# Patient Record
Sex: FEMALE | Race: WHITE
[De-identification: names, ages, dates, MRNs, and addresses within clinical notes are randomized per-mention and may not be internally consistent; named-entity substitution may affect disease eponyms.]

---

## 2018-09-13 ENCOUNTER — HOSPITAL ENCOUNTER (INPATIENT)
Dept: HOSPITAL 36 - GERO2 | Age: 68
LOS: 26 days | Discharge: NURSING, REHAB OR LONG-TERM CARE | DRG: 885 | End: 2018-10-09
Attending: PSYCHIATRY & NEUROLOGY | Admitting: PSYCHIATRY & NEUROLOGY
Payer: MEDICARE

## 2018-09-13 VITALS — DIASTOLIC BLOOD PRESSURE: 68 MMHG | SYSTOLIC BLOOD PRESSURE: 117 MMHG

## 2018-09-13 DIAGNOSIS — E11.9: ICD-10-CM

## 2018-09-13 DIAGNOSIS — E78.5: ICD-10-CM

## 2018-09-13 DIAGNOSIS — Z88.5: ICD-10-CM

## 2018-09-13 DIAGNOSIS — Z88.8: ICD-10-CM

## 2018-09-13 DIAGNOSIS — I10: ICD-10-CM

## 2018-09-13 DIAGNOSIS — F29: Primary | ICD-10-CM

## 2018-09-13 DIAGNOSIS — Z88.1: ICD-10-CM

## 2018-09-13 DIAGNOSIS — Z91.19: ICD-10-CM

## 2018-09-13 PROCEDURE — Z7610: HCPCS

## 2018-09-14 LAB
CHOLEST SERPL-MCNC: 162 MG/DL (ref ?–200)
HDLC SERPL-MCNC: 52 MG/DL (ref 23–92)
TRIGL SERPL-MCNC: 119 MG/DL (ref ?–150)

## 2018-09-14 NOTE — PSYCHIATRIC EVALUATION
DATE OF SERVICE:  09/14/2018



JUSTIFICATION FOR HOSPITALIZATION:  The patient is coming in to the hospital,

believing her  was poisoning her.  The patient was walking in the middle

of the street and coherent.



CHIEF COMPLAINT:  "Please send me home now."



HISTORY OF PRESENT ILLNESS:  A 68-year-old female, confused, disoriented,

believes the year is 1980, knows the month of September, believes it is

Thursday, has no idea why she is here.  Apparently, she was believing her

 was trying to poison her.  She was leaving her residence therefore

walking in the middle of the street, walking in circles and coherent, noted to

be confused, irritable, agitated and following me around the unit preoccupied,

stating that she wants her calm and she wants to go home, very demanding, trying

to leave the unit.  The patient making statements that her  was trying to

kill her for some unclear reason.  Difficult to interview because she is so

fixated on leaving telling me to sign her out immediately.



PAST PSYCHIATRIC HISTORY:  Unclear, but there is some documentation about

possibly bipolar versus depression.



FAMILY HISTORY:  Noncontributory.



SOCIAL HISTORY:  The patient states she was born in [place], .  She

states she has 2 children, 15 grandchildren and unclear drugs or alcohol or 

tobacco.



MENTAL STATUS EXAMINATION:  Stated age.  Fair eye contact.  Speech rambling

loud.  Mood "ready to go home."  Affect flat.  Thought processes were

disoriented.  No SI, no HI.  No auditory or visual hallucinations, but the

patient does appear to be psychotic and paranoid.  Insight poor.  Judgment

diminished.



PROVISIONAL DIAGNOSES:  Psychosis, unspecified.  Under medical, please see full

H and P, also rule out dementia.



ESTIMATED LENGTH OF STAY:  7-10 days.



ASSESSMENT:  The patient's psychotic left her house, rambling, walking in

circles, paranoid, believing  is trying to kill her.



PLAN:  We will initiate low-dose antipsychotic, increase collateral.



TREATMENT PLAN:  Includes group as well as milieu therapy.



CONDITIONS FOR DISCHARGE:  Improved mood, improved affect, better control of any

psychotic symptoms.





DD: 09/14/2018 07:19

DT: 09/14/2018 08:30

JOB# [id number]  [id number]

## 2018-09-15 LAB
ANION GAP SERPL CALC-SCNC: 13.9 MMOL/L (ref 7–16)
BASOPHILS # BLD AUTO: 0.1 TH/CUMM (ref 0–0.2)
BASOPHILS NFR BLD AUTO: 1.3 % (ref 0–2)
BUN SERPL-MCNC: 14 MG/DL (ref 7–25)
CALCIUM SERPL-MCNC: 9.6 MG/DL (ref 8.6–10.3)
CHLORIDE SERPL-SCNC: 102 MEQ/L (ref 98–107)
CO2 SERPL-SCNC: 21.8 MEQ/L (ref 21–31)
CREAT SERPL-MCNC: 0.6 MG/DL (ref 0.6–1.2)
EOSINOPHIL # BLD AUTO: 0.1 TH/CMM (ref 0.1–0.4)
EOSINOPHIL NFR BLD AUTO: 1.2 % (ref 0–5)
ERYTHROCYTE [DISTWIDTH] IN BLOOD BY AUTOMATED COUNT: 12.7 % (ref 11.5–20)
GLUCOSE SERPL-MCNC: 170 MG/DL (ref 70–105)
HCT VFR BLD CALC: 42.8 % (ref 41–60)
HGB BLD-MCNC: 14 GM/DL (ref 12–16)
LYMPHOCYTE AB SER FC-ACNC: 1.9 TH/CMM (ref 1.5–3)
LYMPHOCYTES NFR BLD AUTO: 21.6 % (ref 20–50)
MCH RBC QN AUTO: 29.1 PG (ref 27–31)
MCHC RBC AUTO-ENTMCNC: 32.6 PG (ref 28–36)
MCV RBC AUTO: 89.2 FL (ref 81–100)
MONOCYTES # BLD AUTO: 0.5 TH/CMM (ref 0.3–1)
MONOCYTES NFR BLD AUTO: 5.6 % (ref 2–10)
NEUTROPHILS # BLD: 6.1 TH/CMM (ref 1.8–8)
NEUTROPHILS NFR BLD AUTO: 70.3 % (ref 40–80)
PLATELET # BLD: 424 TH/CMM (ref 150–400)
PMV BLD AUTO: 6.8 FL
POTASSIUM SERPL-SCNC: 3.7 MEQ/L (ref 3.5–5.1)
RBC # BLD AUTO: 4.8 MIL/CMM (ref 3.8–5.2)
SODIUM SERPL-SCNC: 134 MEQ/L (ref 136–145)
WBC # BLD AUTO: 8.7 TH/CMM (ref 4.8–10.8)

## 2018-09-15 RX ADMIN — ASPIRIN 81 MG SCH MG: 81 TABLET ORAL at 08:54

## 2018-09-15 RX ADMIN — FLUTICASONE PROPIONATE SCH SPR: 50 SPRAY, METERED NASAL at 08:53

## 2018-09-15 NOTE — HISTORY & PHYSICAL
ADMIT DATE:     09/13/2018



REASON FOR ADMISSION:  Psychiatric disorder.



HISTORY OF PRESENT ILLNESS:  This is a 68-year-old female with underlying

history of hypertension, hyperlipidemia, diabetes, some mental disorders, was

admitted to Geropsych Unit for underlying psychiatric illness by Dr. Craft. 

Dr. Craft requested medical H and P on this patient.



PAST MEDICAL HISTORY:  Diabetes, hypertension, hyperlipidemia and mental

disorders.



PAST SURGICAL HISTORY:  Noncontributory.



SOCIAL HISTORY:  Lives in a nursing facility.  No alcohol, tobacco or street

drug use.



CURRENT MEDICATIONS:  As per medication reconciliation.



ALLERGIES:  AMLODIPINE, BENAZEPRIL,and CODEINE.



REVIEW OF SYSTEMS:  No fever, no chills, no diarrhea, no vomiting, no abdominal

pain, no chest pain, no short of breath, no headache.  No other medical

complaints.



PHYSICAL EXAMINATION:

VITAL SIGNS:  Temperature 97.2, pulse 63, respirations 19 and blood pressure

136/81.

HEENT:  Unremarkable.

HEART:  S1 and S2 normal.

LUNGS:  Clear to auscultation.

ABDOMEN:  Soft.

EXTREMITIES:  No edema.



ASSESSMENT:

1.  Hypertension, stable.

2.  Diabetes.

3.  Hyperlipidemia.

4.  Mental disorder.



PLAN:  The patient admitted to Geropsych unit.  patient's underlying medical 
problems 

seems stable.  Home medication reconciled, continue.  Lab orders given.  The

patient is medically stable.



Thank you Dr. Craft for allowing me to participate in the care of this

patient.





DD: 09/14/2018 21:52

DT: 09/15/2018 00:35

[facility]# [id number]  [id number]

[facility]

## 2018-09-16 RX ADMIN — ASPIRIN 81 MG SCH: 81 TABLET ORAL at 09:45

## 2018-09-16 RX ADMIN — ASPIRIN 81 MG SCH MG: 81 TABLET ORAL at 10:00

## 2018-09-16 RX ADMIN — FLUTICASONE PROPIONATE SCH SPR: 50 SPRAY, METERED NASAL at 09:45

## 2018-09-16 NOTE — PROGRESS NOTES
DATE:  09/15/2018



This is Dr. Bailey covering for Dr. Craft.



This is a 68-year-old female who is presenting here, confused and disoriented,

believing that it was 1980 and also month of September, but believed that it was

Thursday and not understanding why she was here.



Today on face-to-face evaluation, the patient was responding internally,

aggressive, yelling, screaming and very difficult to redirect in conversation,

had required emergent medications.



MENTAL STATUS EXAMINATION:  The patient is on lorazepam as needed, risperidone

0.5 mg p.o. b.i.d.  



ASSESSMENT AND PLAN:  The patient, who presents irritable, agitated and

aggressive requiring emergent medications.  We will continue with primary

psychiatrist's treatment plan. We will obtain more collateral and baseline

information.





DD: 09/15/2018 11:25

DT: 09/16/2018 18:28

[facility]# [id number]  [id number]

## 2018-09-17 RX ADMIN — FLUTICASONE PROPIONATE SCH SPR: 50 SPRAY, METERED NASAL at 09:29

## 2018-09-17 RX ADMIN — ASPIRIN 81 MG SCH MG: 81 TABLET ORAL at 09:27

## 2018-09-17 NOTE — PROGRESS NOTES
DATE:  



SUBJECTIVE:  The patient was seen and evaluated.  The patient's chart reviewed.



Overnight, the patient required emergent medication because of aggressive

behavior.  Today on face-to-face evaluation, the patient admits that she has

been multiple hospitalizations and because of aggressive behavior, when

attempting to discuss it in more detail what she means by aggressive behavior,

she becomes more agitated, starts banging on the Kerline chair.



MENTAL STATUS EXAMINATION:  Psychotic, delusional, aggressive as evident by

banging her hand on the Kerline chair.



ASSESSMENT AND PLAN:  The patient is a 68-year-old female, severely aggressive

and agitated.  We will continue with the current medication regimen.  She will

continue to be safe to target the patient's irritable and agitative state.





DD: 09/16/2018 11:11

DT: 09/17/2018 07:45

[facility]# [id number]  [id number]

## 2018-09-17 NOTE — PROGRESS NOTES
DATE:  09/17/2018



SUBJECTIVE:  The patient coming to the hospital, believing her  was

poisoning her.  The patient was walking in the middle of the street, not

coherent.  On face-to-face, the patient is very intrusive, following me around,

hitting the table, restless, requiring constant redirection, prompting, very

demanding, constantly asking to go home.  When I tell her she cannot go home,

she continues asking, very ruminative.  The patient requiring emergency orders

of medications, irritable, calling the nursing staff all the time, appearing

confused, does not know why she is here.  At one point, believing that staff is

trying to kill her as well.



ASSESSMENT:  The patient is intrusive, agitated, making nonsensical statements,

believing  was poisoning her.



PLAN:  The patient will be receiving Haldol cocktail x1 now.  Apparently

allergic to BENADRYL.  Given her ongoing symptoms, she is not safe for

discharge.





DD: 09/17/2018 10:48

DT: 09/17/2018 21:03

JOB# [id number]  [id number]

## 2018-09-18 RX ADMIN — FLUTICASONE PROPIONATE SCH SPR: 50 SPRAY, METERED NASAL at 10:14

## 2018-09-18 RX ADMIN — ASPIRIN 81 MG SCH MG: 81 TABLET ORAL at 10:16

## 2018-09-19 RX ADMIN — ASPIRIN 81 MG SCH MG: 81 TABLET ORAL at 09:46

## 2018-09-19 RX ADMIN — FLUTICASONE PROPIONATE SCH SPR: 50 SPRAY, METERED NASAL at 09:45

## 2018-09-19 NOTE — PROGRESS NOTES
DATE:  09/18/2018



SUBJECTIVE:  The patient disoriented, preoccupied, intrusive, apparently

believed her  was trying to poison her.  Mostly fixated on leaving.  She

becomes restless, combative, aggressive towards staff, at times was trying to

hit others or approach her.  The patient has required emergency medications as

well.  She has been here very confused, has no recollection as to why she is

here, taking Risperdal.  Recent dose increase.



ASSESSMENT:  The patient remains symptomatic, agitated, trying to hit other

people, very confused, delusional, thought her  was trying to poison her.



PLAN:  We will continue to monitor, titrate and adjust medications.  Given the

patient's ongoing aggressive symptoms, she is not safe for discharge.





DD: 09/18/2018 13:27

DT: 09/19/2018 02:49

JOB# [id number]  [id number]

## 2018-09-19 NOTE — PROGRESS NOTES
DATE:  09/19/2018



SUBJECTIVE:  The patient is very intrusive, following me around, very

repetitious, asking the same questions over and over and over again ad nauseam,

very forgetful, needing a lot of prompting, constant redirection, wanting to

leave the hospital, stating that she wants to go to [place] to be near her

.  Noncompliant at times, restless, pacing.  She is taking medications. 

No overt side effects noted.  EPS has been contacted because it seems that the

patient's  is not able to care for the patient given how confused she is,

how aggressive she can become, sometimes yelling, irritable.  Medications were

noted.



ASSESSMENT:  The patient is confused, highly intrusive, a lot of behavioral

disturbances, wandering back and forth in the unit asking the same questions

over ad nauseam.



PLAN:  We will continue to monitor.  We are also at this time trying to confirm

the safe disposition plan at the same time that we are attempting to stabilize

her.





DD: 09/19/2018 16:25

DT: 09/19/2018 23:13

[facility]# [id number]  [id number]

## 2018-09-20 RX ADMIN — FLUTICASONE PROPIONATE SCH SPR: 50 SPRAY, METERED NASAL at 09:23

## 2018-09-20 RX ADMIN — ASPIRIN 81 MG SCH MG: 81 TABLET ORAL at 09:05

## 2018-09-20 NOTE — PROGRESS NOTES
DATE:  09/20/2018



SUBJECTIVE:  The patient is very confused, disoriented, aggressive, agitated,

ripping off the nurse's clothes and banging on tables and aggressive, needs near

constant redirection, very restless, unable to be cared for.  Certainly at a

lower level of care, is screaming, yelling.



ASSESSMENT:  The patient requiring a Kerline chair, very aggressive, agitated,

combative at times.



PLAN:  We will continue to monitor.  The patient will likely need titration of

medications.  We will increase Ativan dosing p.r.n. and will stop Risperdal and

start low dose of Seroquel b.i.d.



MEDICATIONS:  Reviewed.





DD: 09/20/2018 10:08

DT: 09/20/2018 14:51

JOB# [id number]  [id number]

## 2018-09-21 RX ADMIN — ASPIRIN 81 MG SCH MG: 81 TABLET ORAL at 09:25

## 2018-09-21 RX ADMIN — FLUTICASONE PROPIONATE SCH SPR: 50 SPRAY, METERED NASAL at 09:22

## 2018-09-21 NOTE — PROGRESS NOTES
DATE:  09/21/2018



The patient ruminative, repetition, still asking to live, to be transferred,

asks is everything okay multiple times during the time that I talked to her,

poorly oriented, very confused, requiring high level of redirection, prompting,

states she wants to go home with her .  It is unclear if she is able to

really care for her basic needs at home.  Adult Protective Services is involved

because of the  who is elderly himself cannot really take care of the

patient.  The patient seems somewhat calmer versus yesterday.  Cervical dosing

seems to be calming the patient down.  We will continue to monitor.  The patient

remains impulsive, unpredictable.  We will also consider Namenda.





DD: 09/21/2018 11:35

DT: 09/21/2018 21:09

JOB# [id number]  [id number]

## 2018-09-22 RX ADMIN — ASPIRIN 81 MG SCH MG: 81 TABLET ORAL at 08:53

## 2018-09-22 RX ADMIN — FLUTICASONE PROPIONATE SCH SPR: 50 SPRAY, METERED NASAL at 08:53

## 2018-09-22 NOTE — PROGRESS NOTES
DATE:  09/22/2018



SUBJECTIVE:  The patient slept for about 5 hours, agitated, very fixated on

being transferred.  There is no indication that a transfer is possible, but the

patient believes otherwise she repeats this probably 100 times a day if not

more.  I explained to her what is going on and then a few minutes later, she

repeats the same thing that she wants to be transferred.  The patient agitated,

throwing cups of water, punching a table, threatening to throw a binder at me to

the point that the staff have to get involved and redirect her, wandering the

units.  The patient disoriented, needs constant reorientation.  She knows where

she is.  She knows she is in the hospital.  She has no idea why she is in the

hospital.  She does not know the year, the month.  The patient focused on going

home, but as of right now, she cannot go home and she does not remember why she

is here, asking to speak to a , but then forgetting that she never

actually spoke to a  and talking about the court and the  for some

reason, trying to scratch staff.



ASSESSMENT:  The patient is combative, demanding, agitated, threatening to harm

staff.  Currently on Namenda.  She may benefit from dosing of Aricept.



PLAN:  We will continue to monitor.  Given her ongoing symptoms, she is not safe

for discharge at this time.





DD: 09/22/2018 06:40

DT: 09/22/2018 11:19

JOB# [id number]  [id number]

## 2018-09-23 RX ADMIN — FLUTICASONE PROPIONATE SCH: 50 SPRAY, METERED NASAL at 10:18

## 2018-09-23 RX ADMIN — ASPIRIN 81 MG SCH MG: 81 TABLET ORAL at 09:28

## 2018-09-23 NOTE — PROGRESS NOTES
DATE:  09/23/2018



SUBJECTIVE:  The patient is disorganized, agitated convinced the staff is

putting poison in her water, noted to be paranoid, unpredictable, requiring

constant redirection, prompting, at times going in a Kerline chair.  The patient

mumbling, claiming that people are trying to kidnap her, calling staff members,

pros and cons.  The patient did have some EPS from Seroquel.  It seems it was

stopped.



ASSESSMENT:  The patient is paranoid, disoriented, confused, psychotic

appearing.  We will stop Seroquel.  The patient with very poor sleep, agitation

and psychosis.  We will initiate Zyprexa dosing.





DD: 09/23/2018 06:30

DT: 09/23/2018 08:55

JOB# [id number]  [id number]

## 2018-09-24 RX ADMIN — ASPIRIN 81 MG SCH MG: 81 TABLET ORAL at 09:31

## 2018-09-24 RX ADMIN — FLUTICASONE PROPIONATE SCH SPR: 50 SPRAY, METERED NASAL at 09:31

## 2018-09-25 RX ADMIN — ASPIRIN 81 MG SCH MG: 81 TABLET ORAL at 09:24

## 2018-09-25 RX ADMIN — FLUTICASONE PROPIONATE SCH: 50 SPRAY, METERED NASAL at 09:23

## 2018-09-25 NOTE — PROGRESS NOTES
DATE:  09/24/2018



SUBJECTIVE:  The patient in bed, restless, hyperverbal, requiring emergency

dosing of Ativan.  Noted to be symptomatic, very psychotic, believing that the

Germans are trying to poison her, people are trying to kill her, the staff is

trying to kill her and poison her, nonsensical, still asking to be transferred. 

I told her transfer is not possible.  She is very ruminative, disorganized,

easily agitated, hostile.



ASSESSMENT:  Ongoing safety concerns.  The patient with ongoing psychotic

symptoms.  I will be increasing her dosing of Zyprexa today.





DD: 09/24/2018 16:37

DT: 09/25/2018 01:32

JOB# [id number]  [id number]

## 2018-09-25 NOTE — PROGRESS NOTES
DATE:  09/25/2018



SUBJECTIVE:  The patient remains intrusive, agitated, highly repetitive and

ruminative, demanding to leave, but has nowhere to go.  Does not seem to

understand why she is here, still believes that the Germans are trying to hurt

or harm her and poison her, believing that staff are trying to harm her.  The

patient is highly restless.   at bedside, notes that he cannot take care

of the patient and that she throws things at home and gets violent and

aggressive.  The patient is very forgetful, constantly asking the same questions

over and over again.



ASSESSMENT:  The patient is confused, combative, highly restless.



PLAN:  We will continue to monitor.  The patient remains disoriented, paranoid,

cannot be cared for at a lesser level of care.  I will titrate her Zyprexa

dosing.  No side effects noted on exam.  Discussed with the patient.  Discussed

with .  Discussed with staff.





DD: 09/25/2018 13:40

DT: 09/25/2018 21:30

JOB# [id number]  [id number]

## 2018-09-26 RX ADMIN — FLUTICASONE PROPIONATE SCH SPR: 50 SPRAY, METERED NASAL at 08:34

## 2018-09-26 RX ADMIN — ASPIRIN 81 MG SCH MG: 81 TABLET ORAL at 08:33

## 2018-09-26 NOTE — PROGRESS NOTES
DATE:  09/26/2018



SUBJECTIVE:  The patient aggressive, confused, labile, intrusive, getting very

close to me to the point that I have to ask her to back away and staff has to

get involved, yelling at times, grabbing items, sometimes trying to strike out

at staff.  The patient is paranoid, believing that there is poison in the water,

that staff is trying to kill her.  "The Germans are trying to kill her."  The

patient is very unruly, following me around the unit, believing that the water

is contaminated.  The patient sometimes refusing medications.  The patient wants

to leave, wants to be transferred, but does not really know where transfer to

right now given her ongoing behavior.



ASSESSMENT:  The patient is confused, forgetful, paranoid, at times medications

noncompliant, other times she does take medications.



PLAN:  We will continue to monitor, ongoing concerns about compliance.  I may

need to follow a [employer] petition.





DD: 09/26/2018 16:29

DT: 09/26/2018 21:09

JOB# [id number]  [id number]

## 2018-09-27 RX ADMIN — ASPIRIN 81 MG SCH MG: 81 TABLET ORAL at 09:38

## 2018-09-27 RX ADMIN — FLUTICASONE PROPIONATE SCH SPR: 50 SPRAY, METERED NASAL at 09:38

## 2018-09-27 NOTE — PROGRESS NOTES
DATE:  09/27/2018



Case was discussed with staff of the patient, reviewed records.  This is

covering for Dr. Craft.  A 68-year-old female who was admitted to ____, she

was disoriented, confused, agitated.  The patient has no idea why she was

admitted, she was living residence, walking in the middle of the street in

circles, confused, irritable, agitated with a possible history of bipolar

disorder.  The patient continues to be unpredictable and impulsive.  Actually,

she ____, she appeared to be very aggressive, irritable, demanding.  She has

been compliant with the medication with no side effects, no sedation, no nausea.

 She is on Aricept 5 mg at bedtime, Namenda 5 mg daily and Zyprexa 10 mg at

bedtime to 12.5 mg daily with no side effects, no sedation, no nausea, no

extrapyramidal symptoms.  I will be increasing Namenda to 5 mg twice a day and I

will continue the patient in group therapy, milieu therapy, and adjust

medications as needed.





DD: 09/27/2018 11:58

DT: 09/27/2018 13:25

JOB# [id number]  [id number]

## 2018-09-28 RX ADMIN — FLUTICASONE PROPIONATE SCH: 50 SPRAY, METERED NASAL at 09:51

## 2018-09-28 RX ADMIN — ASPIRIN 81 MG SCH MG: 81 TABLET ORAL at 09:52

## 2018-09-28 NOTE — PROGRESS NOTES
DATE:  09/28/2018



SUBJECTIVE:  Case was discussed with staff of the patient.  The patient

continues to be unpredictable, impulsive, easily agitated, continues to have

poor insight.  Continues to be confused, unable to make safe plan for self-care.

 She is compliant with the medication with no side effects, no sedation, no

nausea, no extrapyramidal symptoms and we will continue to work with the patient

in group therapy, milieu therapy, adjust medication as needed.





DD: 09/28/2018 12:20

DT: 09/28/2018 21:38

JOB# [id number]  [id number]

## 2018-09-29 RX ADMIN — ASPIRIN 81 MG SCH: 81 TABLET ORAL at 08:49

## 2018-09-29 RX ADMIN — FLUTICASONE PROPIONATE SCH: 50 SPRAY, METERED NASAL at 08:56

## 2018-09-29 NOTE — PROGRESS NOTES
DATE:  



Dr. Aguero covering for Dr. Craft.



SUBJECTIVE:  Chart reviewed and the patient interviewed.  Also discussed the

patient's condition with the staff and reviewed records and labs.  The patient

is still agitated and is still restless.  The patient also is fighting with the

staff.  She also tries to hit others.  The patient also is selective with taking

her medications and sometimes to take medications, sometimes she does not.  She

also paranoid and thinks that the staff are trying to poison her.  Otherwise, no

side effects of medications.



ASSESSMENT:  The patient is still agitated and is still psychotic.



TREATMENT PLAN:  We will increase Zyprexa to 5 mg in the morning and 10 mg at

bedtime.  Also, continue adjusting psychotropic medications and continue to work

on behavioral modifications.





DD: 09/29/2018 08:08

DT: 09/29/2018 08:49

JOB# [id number]  [id number]

## 2018-09-29 NOTE — GENERAL PROGRESS NOTE
Subjective





- Review of Systems


Service Date: 18


Subjective: 





Patient doing ok  Per nursing staff patient has been refusing her meds 





Objective





- Results


Result Diagrams: 


 09/15/18 10:27





 09/15/18 10:27


Recent Labs: 


 Laboratory Last Values











WBC  8.7 Th/cmm (4.8-10.8)   09/15/18  10:27    


 


RBC  4.80 Mil/cmm (3.80-5.20)   09/15/18  10:27    


 


Hgb  14.0 gm/dL (12-16)   09/15/18  10:27    


 


Hct  42.8 % (41.0-60)   09/15/18  10:27    


 


MCV  89.2 fl ()   09/15/18  10:27    


 


MCH  29.1 pg (27.0-31.0)   09/15/18  10:    


 


MCHC Differential  32.6 pg (28.0-36.0)   09/15/18  10:    


 


RDW  12.7 % (11.5-20.0)   09/15/18  10:27    


 


Plt Count  424 Th/cmm (150-400)  H  09/15/18  10:27    


 


MPV  6.8 fl  09/15/18  10:27    


 


Neutrophils %  70.3 % (40.0-80.0)   09/15/18  10:27    


 


Lymphocytes %  21.6 % (20.0-50.0)   09/15/18  10:    


 


Monocytes %  5.6 % (2.0-10.0)   09/15/18  10:    


 


Eosinophils %  1.2 % (0.0-5.0)   09/15/18  10:    


 


Basophils %  1.3 % (0.0-2.0)   09/15/18  10:27    


 


Sodium  134 mEq/L (136-145)  L  09/15/18  10:27    


 


Potassium  3.7 mEq/L (3.5-5.1)   09/15/18  10:27    


 


Chloride  102 mEq/L ()   09/15/18  10:27    


 


Carbon Dioxide  21.8 mEq/L (21.0-31.0)   09/15/18  10:27    


 


Anion Gap  13.9  (7.0-16.0)   09/15/18  10:27    


 


BUN  14 mg/dL (7-25)   09/15/18  10:27    


 


Creatinine  0.6 mg/dL (0.6-1.2)   09/15/18  10:27    


 


Est GFR ( Amer)  > 60.0 ml/min (>90)   09/15/18  10:27    


 


Est GFR (Non-Af Amer)  > 60.0 ml/min  09/15/18  10:27    


 


BUN/Creatinine Ratio  23.3   09/15/18  10:27    


 


Glucose  170 mg/dL ()  H  09/15/18  10:27    


 


POC Glucose  122 MG/DL (70 - 105)  H  18  22:15    


 


Calcium  9.6 mg/dL (8.6-10.3)   09/15/18  10:27    


 


Triglycerides  119 mg/dL (<150)   18  07:00    


 


Cholesterol  162 mg/dL (<200)   18  07:00    


 


LDL Cholesterol Direct  95 mg/dL ()   18  07:00    


 


HDL Cholesterol  52 mg/dL (23-92)   18  07:00    














- Physical Exam


Vitals and I&O: 


 Vital Signs











Temp  97.8 F   18 20:11


 


Pulse  100   18 21:01


 


Resp  18   18 20:11


 


BP  101/65   18 21:01


 


Pulse Ox  97   18 20:11








 Intake & Output











 18





 06:59 18:59 06:59


 


Intake Total 240 1000 240


 


Balance 240 1000 240


 


Intake:   


 


  Oral 240 1000 240


 


Other:   


 


  # Voids 2 3 2


 


  # Bowel Movements  1 











Active Medications: 


Current Medications





Acetaminophen (Tylenol)  650 mg PO Q4HR PRN


   PRN Reason: Mild Pain / Temp above 100


   Stop: 18 20:48


   Last Admin: 18 22:54 Dose:  650 mg


Aspirin (Aspirin Chewable)  81 mg PO DAILY MELITA


   Stop: 18 08:59


   Last Admin: 18 08:49 Dose:  Not Given


Donepezil HCl (Aricept)  5 mg PO HS MELITA


   Stop: 18 20:59


   Last Admin: 18 21:01 Dose:  5 mg


Fluticasone Propionate (Flonase)  1 spr NS DAILY MELITA


   Stop: 18 08:59


   Last Admin: 18 08:56 Dose:  Not Given


Hydralazine HCl (Apresoline)  10 mg PO TID MELITA


   Stop: 18 21:59


   Last Admin: 18 21:01 Dose:  Not Given


Lorazepam (Ativan)  1 mg PO Q6HR PRN; Protocol


   PRN Reason: Anxiety


   Stop: 10/13/18 20:48


   Last Admin: 18 14:02 Dose:  1 mg


Magnesium Hydroxide (Milk Of Magnesia)  30 ml PO HS PRN


   PRN Reason: Constipation


Memantine (Namenda)  5 mg PO BID MELITA


   Stop: 18 16:59


   Last Admin: 18 18:25 Dose:  Not Given


Metformin HCl (Glucophage)  500 mg PO AC MELITA


   Stop: 18 07:29


   Last Admin: 18 18:25 Dose:  Not Given


Olanzapine (Zyprexa)  10 mg PO HS MELITA; Protocol


   Stop: 18 20:59


   Last Admin: 18 21:02 Dose:  10 mg


Olanzapine (Zyprexa)  5 mg PO DAILY MELITA; Protocol


   Stop: 18 08:59


   Last Admin: 18 08:49 Dose:  5 mg


Valsartan (Diovan)  160 mg PO BID MELITA


   Stop: 18 08:59


   Last Admin: 18 18:25 Dose:  Not Given








General: Other (confused)


Cardiovascular: Regular rate


Lungs: Clear to auscultation





Assessment/Plan





- Assessment


Assessment: 





DM II


HTN


NON COMPLIANCE


MENTAL HEALTH DISORDER





- Plan


Plan: 





Medication compliance advised


Continue current medical management


Psych eval and management per psychiatrist








Nutritional Asmnt/Malnutr-PDOC





- Dietary Evaluation


Malnutrition Findings (Please click <Entered> for more info): 








Nutritional Asmnt/Malnutrition                             Start:  18 15:

49


Text:                                                      Status: Complete    

  


Freq:                                                                          

  


Protocol:                                                                      

  


 Document     18 15:49  LCLILLYG  (Rec: 18 16:08  PORFIRIOG  [id number])


 Nutritional Asmnt/Malnutrition


     Patient General Information


      Nutritional Screening                      Moderate Risk


      Diagnosis                                  psychosis


      Pertinent Medical Hx/Surgical Hx           DM, HTN, hyperlipidemia,


                                                 mental disorders


      Subjective Information                     Per EMR, PO intake %,


                                                 avg 75%.


      Current Diet Order/ Nutrition Support      [facility] soft chopped, DM


      Pertinent Medications                      glucophage


      Pertinent Labs                             9/15 Na 134, glucose Ca 9.6


     Nutritional Hx/Data


      Height                                     1.65 m


      Height (Calculated Centimeters)            165.1


      Current Weight (lbs)                       80.286 kg


      Weight (Calculated Kilograms)              80.3


      Weight (Calculated Grams)                  85454.8


      Ideal Body Weight                          125


      Body Mass Index (BMI)                      29.4


     GI Symptoms


      Last BM                                    9/15 x 2


      Skin Integrity/Comment:                    intact


      Current %PO                                Good (%)


     Estimated Nutritional Goals


      BEE in Kcals:                              Using Current wt


      Calories/Kcals/Kg                          23-27


      Kcals Calculated                           4006-6548


      Protein:                                   Using Current wt


      Protein g/k.8


      Protein Calculated                         64


      Fluid: ml                                  1840-2160ml (1ml/kcal)


     Nutritional Problem


      No current Nutrition Prob


       Problem                                   N/A


     Malnutrition Alert


      Is there a minimum of two criteria         No


       selected?                                 


       Query Text:Check all the applicable       


       criteria. A minimum of two criteria are   


       recommended for diagnosis of either       


       severe or non-severe malnutrition.        


     Malnutrition Related to Morbid Obesity


      Malnutrition related to morbid obesity     No


     Intervention/Recommendation


      Comments                                   1. Continue with [facility] soft


                                                 chopped diabetic diet as


                                                 ordered.


                                                 2. Monitor PO intake, wt, labs


                                                 and skin integrity


                                                 3. F/U as low risk in 7 days,


                                                 


     Expected Outcomes/Goals


      Expected Outcomes/Goals                    1. PO intake to meet at least


                                                 75% of nutritional needs.


                                                 2. Wt stability, skin to


                                                 remain intact, labs to


                                                 approach WNL.

## 2018-09-30 RX ADMIN — ASPIRIN 81 MG SCH MG: 81 TABLET ORAL at 09:14

## 2018-09-30 RX ADMIN — FLUTICASONE PROPIONATE SCH: 50 SPRAY, METERED NASAL at 13:35

## 2018-10-01 RX ADMIN — FLUTICASONE PROPIONATE SCH: 50 SPRAY, METERED NASAL at 11:51

## 2018-10-01 RX ADMIN — ASPIRIN 81 MG SCH: 81 TABLET ORAL at 11:51

## 2018-10-01 NOTE — PROGRESS NOTES
DATE:  09/30/2018



Chart reviewed and the patient interviewed.  Also, discussed the patient's

condition with the staff and reviewed records and labs.  The patient is very

agitated and she is restless.  The patient also was fighting and tries to hit

others.  The patient also told me that the staff are trying to poison her and

that she is refusing to take medications, thinking that the medicine is a

poison.  At the same time when I was trying to interview the patient, the

patient was trying to push me to enter is into another unit and she was very

agitated and aggressive and it was difficult for me to calm her down.  The

patient also is still in irritable and angry mood and also her thoughts are

disorganized and was not able to give me any safe plan for self-care.



ASSESSMENT:  The patient is restless, still agitated and is still paranoid and

considered to be gravely disabled.



TREATMENT PLAN:  Continue monitoring her behavior and her condition closely. 

Also continue adjusting psychotropic medications.  Also, we will place the

patient on 30 days certificate based on grave disability.





DD: 09/30/2018 10:39

DT: 10/01/2018 01:22

JOB# [id number]  [id number]

## 2018-10-02 RX ADMIN — FLUTICASONE PROPIONATE SCH: 50 SPRAY, METERED NASAL at 15:33

## 2018-10-02 RX ADMIN — ASPIRIN 81 MG SCH: 81 TABLET ORAL at 15:33

## 2018-10-02 NOTE — PROGRESS NOTES
DATE:  10/02/2018



The patient is combative, trying to throw things, agitated, convinced nursing

staff trying to hurt her, paranoid, requiring emergency medications this

morning, refusing medications stating in fact she is not refusing medications,

worried about the side effects of medications, but she is not taking them to

have side effects.  The patient agitated, combative, violent, aggressive, trying

to grab staff.



PLAN:  We are awaiting the court for the [place] hearing.  The patient remains

violent, aggressive, paranoid.





DD: 10/02/2018 08:08

DT: 10/02/2018 19:47

JOB# [id number]  [id number]

## 2018-10-02 NOTE — PROGRESS NOTES
DATE:  10/01/2018



SUBJECTIVE:  The patient was seen, very aggressive, trying to grab me to the

point that I have to walk away and exit the units.  The patient still believes

that staff was trying to poison her, alluding to having ____, osteoporosis.  The

patient is stating that she wants to be transferred.  The patient remains

aggressive, combative, trying to hit other people, very unruly, refusing to take

her medications.  Reason is unclear, other than her telling me that she does not

feel that she needs.  The patient is sleeping well, eating well, very impulsive,

restless, unpredictable.



ASSESSMENT:  The patient is unruly, combative, violent, aggressive, even

aggressive with this clinician.



PLAN:  We will initiate Mitchel petition.  The patient is currently on a 30-day

hold.





DD: 10/01/2018 21:14

DT: 10/02/2018 17:25

JOB# [id number]  [id number]

## 2018-10-03 RX ADMIN — FLUTICASONE PROPIONATE SCH: 50 SPRAY, METERED NASAL at 10:54

## 2018-10-03 RX ADMIN — ASPIRIN 81 MG SCH: 81 TABLET ORAL at 10:54

## 2018-10-03 NOTE — PROGRESS NOTES
DATE:  10/03/2018



The patient remains confused, disoriented, Riese petition was upheld.  The

patient touching the heads of other patients, getting up from the seat and

sitting down, banging on the table, refusing ordered medications.  Riese was

upheld.  We will start Haldol IM backup.  The patient is unruly, aggressive,

violent, grabbing people, paranoid, believing that her food is being poisoned,

hyperverbal, loud, demanding at times.



ASSESSMENT:  The patient unruly, hyperverbal, aggressive.



PLAN:  We will continue to monitor.  I will be stopping her Zyprexa and will be

initiating Haldol.  She is taking Haldol IM and it seems to be effective.  We

will plan to give Haldol Decanoate over the next few days, based on tolerability

and efficacy given the patient's poor medication compliance.





DD: 10/03/2018 17:10

DT: 10/03/2018 19:10

[facility]# [id number]  [id number]

## 2018-10-04 RX ADMIN — ASPIRIN 81 MG SCH MG: 81 TABLET ORAL at 09:04

## 2018-10-04 RX ADMIN — FLUTICASONE PROPIONATE SCH: 50 SPRAY, METERED NASAL at 09:28

## 2018-10-05 RX ADMIN — ASPIRIN 81 MG SCH MG: 81 TABLET ORAL at 09:56

## 2018-10-05 RX ADMIN — HALOPERIDOL LACTATE PRN MG: 5 INJECTION, SOLUTION INTRAMUSCULAR at 10:44

## 2018-10-05 RX ADMIN — FLUTICASONE PROPIONATE SCH: 50 SPRAY, METERED NASAL at 09:57

## 2018-10-05 RX ADMIN — ASPIRIN 81 MG SCH: 81 TABLET ORAL at 10:42

## 2018-10-05 NOTE — PROGRESS NOTES
DATE:  



SUBJECTIVE:  Chart reviewed and the patient interviewed.  Also discussed the

patient's condition with the staff and reviewed records and labs.  The patient

continued to be combative and continued to be aggressive with the staff,

especially during helping her with her ADLs.  The patient also has difficulty

sitting still and yelling and screaming and wandering into other patient's

rooms.  The patient also has difficulty sleeping at night.  She also continued

to be delusional and paranoid and thinks that staff are trying to poison her. 

She also needs lots of redirections.  Otherwise, the patient is compliant with

taking her medications with no side effects of medications.



ASSESSMENT:  The patient is still agitated and in irritable mood.



TREATMENT PLAN:  Continue to monitor her behavior and her condition closely. 

Also, we will increase Haldol to 5 mg twice a day and will continue to follow

up.





DD: 10/05/2018 06:40

DT: 10/05/2018 07:10

[facility]# [id number]  [id number]

## 2018-10-05 NOTE — PROGRESS NOTES
DATE:  



Dr. Aguero covering for Dr. Craft.



SUBJECTIVE:  Chart reviewed and the patient interviewed.  Also discussed the

patient's condition with the staff and reviewed records and labs.  The patient

is still confused and still seems to be disoriented.  The patient also is still

in irritable mood and she is reluctant to take medications, but at the same

time, the patient is ____ and she has been taking her medications.  The patient

also is still in irritable and aggressive.  Otherwise, the patient continued to

comply with taking Haldol IM since other medications stopped and plenty of

Haldol Decanoate.



ASSESSMENT:  The patient is still aggressive, but not as psychotic as before.



TREATMENT PLAN:  Continue to monitor her behavior and her condition closely. 

Also, continue adjusting psychotropic medications and work on behavior

modification as well as her compliance with taking medications.





DD: 10/04/2018 22:33

DT: 10/05/2018 01:45

JOB# [id number]  [id number]

## 2018-10-06 RX ADMIN — ASPIRIN 81 MG SCH MG: 81 TABLET ORAL at 09:00

## 2018-10-06 RX ADMIN — FLUTICASONE PROPIONATE SCH: 50 SPRAY, METERED NASAL at 09:03

## 2018-10-07 RX ADMIN — ASPIRIN 81 MG SCH: 81 TABLET ORAL at 12:25

## 2018-10-07 RX ADMIN — HALOPERIDOL LACTATE PRN MG: 5 INJECTION, SOLUTION INTRAMUSCULAR at 09:31

## 2018-10-07 RX ADMIN — FLUTICASONE PROPIONATE SCH: 50 SPRAY, METERED NASAL at 12:25

## 2018-10-07 NOTE — PROGRESS NOTES
DATE:  10/07/2018



SUBJECTIVE:  Chart reviewed and the patient interviewed.  Also, discussed the

patient's condition with the staff and reviewed records and labs.  The patient

is still forgetful and confused.  The patient also is still suspicious and

demanding.  The patient also needs lots of directions and has difficulty

redirecting her.  The patient also is still in angry and irritable mood. 

Otherwise, the patient denies any side effects of medications.



ASSESSMENT:  The patient is still psychotic.



TREATMENT PLAN:  Continue adjusting psychotropic medications and monitor her

behavior and continue to follow up.





DD: 10/07/2018 12:30

DT: 10/07/2018 18:38

JOB# [id number]  [id number]

## 2018-10-08 RX ADMIN — FLUTICASONE PROPIONATE SCH: 50 SPRAY, METERED NASAL at 09:44

## 2018-10-08 RX ADMIN — ASPIRIN 81 MG SCH MG: 81 TABLET ORAL at 09:43

## 2018-10-09 RX ADMIN — HALOPERIDOL LACTATE PRN MG: 5 INJECTION, SOLUTION INTRAMUSCULAR at 09:40

## 2018-10-09 RX ADMIN — ASPIRIN 81 MG SCH: 81 TABLET ORAL at 09:40

## 2018-10-09 RX ADMIN — FLUTICASONE PROPIONATE SCH: 50 SPRAY, METERED NASAL at 09:40

## 2018-10-09 NOTE — PROGRESS NOTES
DATE:  



SUBJECTIVE:  The patient seen, chart reviewed, discussed with staff.  The

patient is calmer, likely approaching her baseline, taking her medications,

sometimes requiring intramuscular medications of Haldol, but this has been more

few and far between.  The patient a lot less paranoid, no longer talking about

being poisoned.  The patient still attention seeking, accusation all times,

impulsive, irritable, some rambling, but she is a lot less aggressive.



ASSESSMENT:  The patient seems to be calmer, more cooperative, psychotic

symptoms dissipating.



PLAN:  We will order Haldol Decanoate.  The patient does have confirmation of

placement.





DD: 10/08/2018 10:55

DT: 10/09/2018 05:36

JOB# [id number]  [id number]

## 2018-10-09 NOTE — PROGRESS NOTES
DATE:  10/09/2018



The patient likely at her baseline, calmer, no longer delusional, less paranoid,

no combative behavior, significantly calmer, no need for emergency medications,

better medication compliance, good tolerance.  No EPS.  No overt side effects. 

Sleeping fairly well.  No longer aggressive.



ASSESSMENT:  The patient likely at her baseline, confused, disoriented, but no

longer paranoid, no suicidal gestures.  No SI.  No HI.



PLAN:  We will discharge today.





DD: 10/09/2018 14:32

DT: 10/09/2018 18:50

[facility]# [id number]  [id number]